# Patient Record
(demographics unavailable — no encounter records)

---

## 2024-10-14 NOTE — PHYSICAL EXAM
[Normal Breath Sounds] : Normal breath sounds [Calm] : calm [de-identified] : well-developed  male in NAD [de-identified] : Normal BS, soft, incisions clean and closed, two spitting stiches left and right sides, umbilical hernia repair intact [de-identified] : LIH repair intact [de-identified] : calves soft, non-tender

## 2024-10-14 NOTE — PLAN
[FreeTextEntry1] : -Continue to avoid heavy lifting and abdominal/core exercises. -Ok to lift 20 lbs as of today and can increase by 5 lbs/month going forward. -Warm compresses over area of spitting stitches. -Follow up prn.

## 2024-10-14 NOTE — HISTORY OF PRESENT ILLNESS
[de-identified] : s/p RA Lap Repair LIH and Repair Umbilical Hernia 09/06/2024 [de-identified] : Patient with occasional soreness in abdomen when sit to stand but improving.  Patient denies fever, chills, nausea, vomiting or incision drainage.  The patient is tolerating a regular diet, with normal bowel and bladder habits.   Final Diagnosis 1  Hernia sac and incarcerated omentum Hernia sac and adipose tissue with reactive changes and congestion.